# Patient Record
Sex: MALE | Race: OTHER | HISPANIC OR LATINO
[De-identification: names, ages, dates, MRNs, and addresses within clinical notes are randomized per-mention and may not be internally consistent; named-entity substitution may affect disease eponyms.]

---
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
## 2022-10-02 ENCOUNTER — EMERGENCY (EMERGENCY)
Facility: HOSPITAL | Age: 22
LOS: 0 days | Discharge: HOME | End: 2022-10-02
Attending: EMERGENCY MEDICINE | Admitting: EMERGENCY MEDICINE

## 2022-10-02 VITALS
RESPIRATION RATE: 20 BRPM | OXYGEN SATURATION: 98 % | TEMPERATURE: 97 F | HEART RATE: 88 BPM | DIASTOLIC BLOOD PRESSURE: 89 MMHG | SYSTOLIC BLOOD PRESSURE: 158 MMHG

## 2022-10-02 DIAGNOSIS — N45.1 EPIDIDYMITIS: ICD-10-CM

## 2022-10-02 DIAGNOSIS — N50.812 LEFT TESTICULAR PAIN: ICD-10-CM

## 2022-10-02 DIAGNOSIS — R10.32 LEFT LOWER QUADRANT PAIN: ICD-10-CM

## 2022-10-02 LAB
APPEARANCE UR: CLEAR — SIGNIFICANT CHANGE UP
BILIRUB UR-MCNC: NEGATIVE — SIGNIFICANT CHANGE UP
COLOR SPEC: YELLOW — SIGNIFICANT CHANGE UP
DIFF PNL FLD: ABNORMAL
GLUCOSE UR QL: NEGATIVE MG/DL — SIGNIFICANT CHANGE UP
KETONES UR-MCNC: NEGATIVE — SIGNIFICANT CHANGE UP
LEUKOCYTE ESTERASE UR-ACNC: NEGATIVE — SIGNIFICANT CHANGE UP
NITRITE UR-MCNC: NEGATIVE — SIGNIFICANT CHANGE UP
PH UR: 6 — SIGNIFICANT CHANGE UP (ref 5–8)
PROT UR-MCNC: NEGATIVE MG/DL — SIGNIFICANT CHANGE UP
RBC CASTS # UR COMP ASSIST: SIGNIFICANT CHANGE UP /HPF
SP GR SPEC: 1.02 — SIGNIFICANT CHANGE UP (ref 1.01–1.03)
UROBILINOGEN FLD QL: 0.2 MG/DL — SIGNIFICANT CHANGE UP
WBC UR QL: SIGNIFICANT CHANGE UP /HPF

## 2022-10-02 PROCEDURE — 76870 US EXAM SCROTUM: CPT | Mod: 26

## 2022-10-02 PROCEDURE — 99284 EMERGENCY DEPT VISIT MOD MDM: CPT

## 2022-10-02 RX ORDER — IBUPROFEN 200 MG
600 TABLET ORAL ONCE
Refills: 0 | Status: DISCONTINUED | OUTPATIENT
Start: 2022-10-02 | End: 2022-10-02

## 2022-10-02 RX ORDER — CEFTRIAXONE 500 MG/1
500 INJECTION, POWDER, FOR SOLUTION INTRAMUSCULAR; INTRAVENOUS ONCE
Refills: 0 | Status: DISCONTINUED | OUTPATIENT
Start: 2022-10-02 | End: 2022-10-02

## 2022-10-02 NOTE — ED PROVIDER NOTE - NS ED ROS FT
Review of Systems:  	•	CONSTITUTIONAL: no fever, no diaphoresis, no chills  	•	SKIN: no rash   	•	GI: no abd pain, no nausea, no vomiting   	•	GENITO-URINARY: +L testicular pain. no discharge, no dysuria; no hematuria, no increased urinary frequency  	•	MUSCULOSKELETAL: no joint paint, no swelling, no redness  	•	NEUROLOGIC: no weakness

## 2022-10-02 NOTE — ED PROVIDER NOTE - NSFOLLOWUPINSTRUCTIONS_ED_ALL_ED_FT
Epididymitis    Epididymitis is swelling (inflammation) of the epididymis. The epididymis is a cord-like structure that is located along the top and back part of the testicle. It collects and stores sperm from the testicle.     This condition can also cause pain and swelling of the testicle and scrotum. Symptoms usually start suddenly (acute epididymitis). Sometimes epididymitis starts gradually and lasts for a while (chronic epididymitis). This type may be harder to treat.    CAUSES  In men 35 and younger, this condition is usually caused by a bacterial infection or sexually transmitted disease (STD), such as:    Gonorrhea.   Chlamydia.      In men 35 and older who do not have anal sex, this condition is usually caused by bacteria from a blockage or abnormalities in the urinary system. These can result from:    Having a tube placed into the bladder (urinary catheter).  Having an enlarged or inflamed prostate gland.  Having recent urinary tract surgery.    In men who have a condition that weakens the body's defense system (immune system), such as HIV, this condition can be caused by:     Other bacteria, including tuberculosis and syphilis.  Viruses.  Fungi.    Sometimes this condition occurs without infection. That may happen if urine flows backward into the epididymis after heavy lifting or straining.    RISK FACTORS  This condition is more likely to develop in men:    Who have unprotected sex with more than one partner.  Who have anal sex.    Who have recently had surgery.    Who have a urinary catheter.   Who have urinary problems.  Who have a suppressed immune system.      SYMPTOMS  This condition usually begins suddenly with chills, fever, and pain behind the scrotum and in the testicle. Other symptoms include:     Swelling of the scrotum, testicle, or both.  Pain when ejaculating or urinating.  Pain in the back or belly.  Nausea.  Itching and discharge from the penis.  Frequent need to pass urine.  Redness and tenderness of the scrotum.    DIAGNOSIS  Your health care provider can diagnose this condition based on your symptoms and medical history. Your health care provider will also do a physical exam to ask about your symptoms and check your scrotum and testicle for swelling, pain, and redness. You may also have other tests, including:      Examination of discharge from the penis.   Urine tests for infections, such as STDs.      Your health care provider may test you for other STDs, including HIV.     TREATMENT  Treatment for this condition depends on the cause. If your condition is caused by a bacterial infection, oral antibiotic medicine may be prescribed. If the bacterial infection has spread to your blood, you may need to receive IV antibiotics. Nonbacterial epididymitis is treated with home care that includes bed rest and elevation of the scrotum.    Surgery may be needed to treat:    Bacterial epididymitis that causes pus to build up in the scrotum (abscess).  Chronic epididymitis that has not responded to other treatments.     HOME CARE INSTRUCTIONS  Medicines     Take over-the-counter and prescription medicines only as told by your health care provider.    If you were prescribed an antibiotic medicine, take it as told by your health care provider. Do not stop taking the antibiotic even if your condition improves.     Sexual Activity     If your epididymitis was caused by an STD, avoid sexual activity until your treatment is complete.  Inform your sexual partner or partners if you test positive for an STD. They may need to be treated. Do not engage in sexual activity with your partner or partners until their treatment is completed.    General Instructions     Return to your normal activities as told by your health care provider. Ask your health care provider what activities are safe for you.  Keep your scrotum elevated and supported while resting. Ask your health care provider if you should wear a scrotal support, such as a jockstrap. Wear it as told by your health care provider.  If directed, apply ice to the affected area:    Put ice in a plastic bag.  Place a towel between your skin and the bag.  Leave the ice on for 20 minutes, 2–3 times per day.  Try taking a sitz bath to help with discomfort. This is a warm water bath that is taken while you are sitting down. The water should only come up to your hips and should cover your buttocks. Do this 3–4 times per day or as told by your health care provider.  Keep all follow-up visits as told by your health care provider. This is important.    SEEK MEDICAL CARE IF:  You have a fever.    Your pain medicine is not helping.    Your pain is getting worse.    Your symptoms do not improve within three days.     ADDITIONAL NOTES AND INSTRUCTIONS    Please follow up with your Primary MD in 24-48 hr.  Seek immediate medical care for any new/worsening signs or symptoms.     Hydrocele    WHAT YOU NEED TO KNOW:    A hydrocele is a collection of fluid inside the scrotum. The scrotum holds the testicles. Hydroceles are simple or communicating. A simple hydrocele stays the same size. A communicating hydrocele gets bigger and smaller as fluid flows into and out of the scrotum.    DISCHARGE INSTRUCTIONS:    Medicines:     Pain medicine:     You may need medicine to take away or decrease pain.   Learn how to take your medicine. Ask what medicine and how much you should take. Be sure you know how, when, and how often to take it.      Do not wait until the pain is severe before you take your medicine. Tell your healthcare provider if your pain does not decrease.      Pain medicine can make you dizzy or sleepy. Prevent falls by calling someone when you get out of bed or if you need help.      Take your medicine as directed. Contact your healthcare provider if you think your medicine is not helping or if you have side effects. Tell him of her if you are allergic to any medicine. Keep a list of the medicines, vitamins, and herbs you take. Include the amounts, and when and why you take them. Bring the list or the pill bottles to follow-up visits. Carry your medicine list with you in case of an emergency.    Follow up with your healthcare provider or urologist as directed: Write down your questions so you remember to ask them during your visits.     Support: You may need to wear a fabric support device similar to a jock strap to decrease swelling.    Contact your healthcare provider or urologist if:     The swelling gets worse or does not go away.      You have questions or concerns about your condition or care.    Return to the emergency department if:     You have severe pain in your scrotum.      You have a fever and your scrotum is red and swollen.

## 2022-10-02 NOTE — ED PROVIDER NOTE - CLINICAL SUMMARY MEDICAL DECISION MAKING FREE TEXT BOX
Testicular sonogram consistent with epididymitis.  Given Rocephin and doxycycline.  Will discharge to follow-up with urology.

## 2022-10-02 NOTE — ED PROVIDER NOTE - CARE PROVIDER_API CALL
Tor Herrera)  Urology  [address] [place] [address]  Phone: [phone]  Fax: [phone]  Follow Up Time: 1-3 Days

## 2022-10-02 NOTE — ED PROVIDER NOTE - PHYSICAL EXAMINATION
CONSTITUTIONAL: Well-developed; well-nourished; in no acute distress, nontoxic appearing  SKIN: skin exam is warm and dry  ENT: MMM  ABD: soft; non-distended; non-tender.  : Performed by Dr Adame, +TTP overlying L testicle, no overlying skin changes, +cremasteric reflex intact bilaterally.   EXT: Normal ROM.   NEURO: awake, alert, following commands, oriented, grossly unremarkable. No Focal deficits. GCS 15.   PSYCH: Cooperative, appropriate.

## 2022-10-02 NOTE — ED PROVIDER NOTE - NS ED ATTENDING STATEMENT MOD
This was a shared visit with the PASQUALE. I reviewed and verified the documentation and independently performed the documented:

## 2022-10-02 NOTE — ED PROVIDER NOTE - PATIENT PORTAL LINK FT
You can access the FollowMyHealth Patient Portal offered by [facility] by registering at the following website: [email or web address]. By joining [facility]’s FollowMyHealth portal, you will also be able to view your health information using other applications (apps) compatible with our system.

## 2022-10-02 NOTE — ED ADULT TRIAGE NOTE - RESPIRATORY RATE (BREATHS/MIN)
[address] Ale[facility] Sprague [facility] Admission Coordinator had a telephone conversation with patient  Information on [facility] services, anticipated length of stay, quarantine, visitation, and unit's current Covid status provided  Patient states she is fully Covid vaccinated  Covid immunization has not been verified  Patient with no noted recent Covid exposure  Admission Coordinator contacted Ann Marie Mcgrath immunization verified on 8/11//21  20

## 2022-10-02 NOTE — ED PROVIDER NOTE - ATTENDING APP SHARED VISIT CONTRIBUTION OF CARE
Patient is a 22-year-old male who was doing heavy lifting at the gym and noticed the next day some discomfort in his left testicle that is worse with standing.  Denies any dysuria or trouble urinating.  No abdominal pain or vomiting or diarrhea.  He was concerned that he may be developing a hernia so came into the ED for evaluation.    Exam: Soft nontender abdomen, no palpable abdominal wall defect, testicles are normal lie with mild soreness, left epididymal soreness, nontender scrotal wall, no acute distress, normal gait  Plan: Testicular sonogram

## 2022-10-04 LAB
CULTURE RESULTS: NO GROWTH — SIGNIFICANT CHANGE UP
SPECIMEN SOURCE: SIGNIFICANT CHANGE UP

## 2022-12-26 ENCOUNTER — NON-APPOINTMENT (OUTPATIENT)
Age: 22
End: 2022-12-26

## 2023-03-28 NOTE — ED PROVIDER NOTE - PHYSICAL EXAMINATION
CONSTITUTIONAL: Well-developed; well-nourished; in no acute distress.   SKIN: warm, dry  EYES: no conj injection  ENT: No nasal discharge; moist mucous membranes,  airway clear.  CARD: S1, S2 normal  RESP: No wheezes, rales or rhonchi.  ABD: soft, nondistended. mild left lower quadrant tenderness.  no rebound or guarding.  :  normal external genitalia.  bilaterally descended testes without tenderness or masses. no scrotal edema.  (+) cremasteric bilaterally.   EXT: Normal ROM.  5/5 strength bilaterally.  2+ radial pulses bilaterally  NEURO: Alert, oriented, grossly unremarkable
no

## 2023-09-21 ENCOUNTER — APPOINTMENT (OUTPATIENT)
Dept: UROLOGY | Facility: CLINIC | Age: 23
End: 2023-09-21
Payer: MEDICAID

## 2023-09-21 VITALS
TEMPERATURE: 97.9 F | OXYGEN SATURATION: 98 % | HEIGHT: 69 IN | RESPIRATION RATE: 16 BRPM | BODY MASS INDEX: 27.25 KG/M2 | HEART RATE: 96 BPM | WEIGHT: 184 LBS

## 2023-09-21 DIAGNOSIS — Z78.9 OTHER SPECIFIED HEALTH STATUS: ICD-10-CM

## 2023-09-21 DIAGNOSIS — N50.89 OTHER SPECIFIED DISORDERS OF THE MALE GENITAL ORGANS: ICD-10-CM

## 2023-09-21 DIAGNOSIS — Z82.49 FAMILY HISTORY OF ISCHEMIC HEART DISEASE AND OTHER DISEASES OF THE CIRCULATORY SYSTEM: ICD-10-CM

## 2023-09-21 DIAGNOSIS — Z80.3 FAMILY HISTORY OF MALIGNANT NEOPLASM OF BREAST: ICD-10-CM

## 2023-09-21 PROCEDURE — 99203 OFFICE O/P NEW LOW 30 MIN: CPT

## 2023-12-27 NOTE — ED PEDIATRIC NURSE NOTE - NSSISCREENINGQ2_ED_A_ED
"Chief Complaint   Patient presents with    Follow Up     6 month follow up for Chron's disease with constipation, abdominal bloating and burning sensation around the abdomen.     He requests these members of his care team be copied on today's visit information:  PCP: Jaron Vines    Vitals:    12/27/23 0924   BP: 111/82   BP Location: Left arm   Patient Position: Sitting   Cuff Size: Adult Regular   Pulse: 64   SpO2: 98%   Weight: 107.9 kg (237 lb 12.8 oz)   Height: 1.854 m (6' 1\")     Body mass index is 31.37 kg/m .    Medications were reconciled.    Does patient need any medication refills at today's visit? Cali Brooks CMA    " No